# Patient Record
(demographics unavailable — no encounter records)

---

## 2025-02-06 NOTE — PHYSICAL EXAM
[Normal] : normal [None] : none [Intermittent] : intermittent [Cooperative] : cooperative [Depressed] : depressed [Full] : full [Clear] : clear [Linear/Goal Directed] : linear/goal directed [Average] : average [WNL] : within normal limits [Positive interaction] : positive interaction [Unremarkable/age appropriate] : unremarkable/age appropriate

## 2025-02-07 NOTE — RISK ASSESSMENT
[Clinical Interview] : Clinical Interview [Collateral Sources] : Collateral Sources [Yes] : Yes [No] : No [Yes, more than three months ago] : Yes, more than three months ago [No known suicide factors] : No known suicide factors [Depressed mood/Anhedonia] : depressed mood/anhedonia [Non-compliant or not receiving treatment] : non-compliant or not receiving treatment [None known] : None known [Identifies reasons for living] : identifies reasons for living [Engaged in work or school] : engaged in work or school [Yes (details below)] : yes [Yes, within past 3 months] : yes, within past 3 months [None Known] : none known [FreeTextEntry1] : Pt denies current SI, plan or intent and urges for NSSIB [TextBox_32] : Pt banged head and had thoughts of cutting  [FreeTextEntry4] : Aggressive towards mom

## 2025-02-07 NOTE — DISCUSSION/SUMMARY
[Low acute suicide risk] : Low acute suicide risk [No] : No [Yes] : Safety Plan completed/updated (for individuals at risk): Yes [FreeTextEntry1] : Pt presents as low risk with risk factors including male gender, prior self-aborted suicide attempt and hx of aggression with significant protective factors such as strong family support, lack of prior self-harm, help-seeking, no substance use, willingness to engage in treatment, engaged in school, current denial of any SI, plan or intent or urges to self-harm, no reports hx of abuse, no access to guns and no legal hx.
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
## 2025-02-07 NOTE — PLAN
[Provision of National Suicide Prevention Lifeline 1-158-484-TALK (5188)] : Provision of national suicide prevention lifeline 4-610-052-talk (0626) [Patient] : patient [Family] : family [Education provided regarding environmental safety/ lethal means restriction] : Education provided regarding environmental safety/ lethal means restriction [Contact was Attempted] : contact was attempted [None on Record] : none on record [Reached regarding Plan] : not reached regarding plan [TextBox_9] : Referral for outpatient therapy [TextBox_13] : Safety plan completed with patient using the [place] Safety Plan."  The Safety Plan is a best practice recommendation by the Suicide [facility].  Safety planning reviewed with patient & family. Advised to secure all potentially dangerous items from home, including but not limited to sharp objects, weapons, prescription and non-prescription medications, and other lethal means out of patient's reach. They deny having any firearms at home. Parent agreed. Parent and patient advised to visit the nearest ED or call 911 for any worsening symptoms or if safety concerns arise. 1800-LIFENET provided. All involved verbalized understanding. [TextBox_26] : Email sent to school counselor to provide overview and recommendations

## 2025-02-07 NOTE — REASON FOR VISIT
[Behavioral Health Urgent Care Assessment] : a behavioral health urgent care assessment [School] : school [Patient] : patient [Self] : alone [Mother] : with mother [[facility] ] : provided by [facility]   [Time Spent: ____ minutes] : Total time spent using  services: [unfilled] minutes. The patient's primary language is not English thus required  services. [TextBox_17] : Assessment and connection to care [Interpreters_IDNumber] : [id number]; [id number] [Interpreters_FullName] : Pierre